# Patient Record
Sex: MALE | Race: WHITE | Employment: FULL TIME | URBAN - METROPOLITAN AREA
[De-identification: names, ages, dates, MRNs, and addresses within clinical notes are randomized per-mention and may not be internally consistent; named-entity substitution may affect disease eponyms.]

---

## 2020-01-28 ENCOUNTER — OFFICE VISIT (OUTPATIENT)
Dept: FAMILY MEDICINE CLINIC | Age: 30
End: 2020-01-28
Payer: COMMERCIAL

## 2020-01-28 VITALS
HEART RATE: 86 BPM | OXYGEN SATURATION: 98 % | HEIGHT: 70 IN | WEIGHT: 223 LBS | DIASTOLIC BLOOD PRESSURE: 84 MMHG | BODY MASS INDEX: 31.92 KG/M2 | TEMPERATURE: 98.2 F | SYSTOLIC BLOOD PRESSURE: 122 MMHG

## 2020-01-28 PROCEDURE — 99204 OFFICE O/P NEW MOD 45 MIN: CPT | Performed by: NURSE PRACTITIONER

## 2020-01-28 RX ORDER — FLUTICASONE PROPIONATE 50 MCG
2 SPRAY, SUSPENSION (ML) NASAL DAILY
Qty: 1 BOTTLE | Refills: 0 | Status: SHIPPED | OUTPATIENT
Start: 2020-01-28 | End: 2021-03-17

## 2020-01-28 RX ORDER — AMOXICILLIN AND CLAVULANATE POTASSIUM 875; 125 MG/1; MG/1
1 TABLET, FILM COATED ORAL 2 TIMES DAILY
Qty: 20 TABLET | Refills: 0 | Status: SHIPPED | OUTPATIENT
Start: 2020-01-28 | End: 2020-02-07

## 2020-01-28 ASSESSMENT — ENCOUNTER SYMPTOMS
DIARRHEA: 0
SINUS PRESSURE: 1
ABDOMINAL DISTENTION: 0
RHINORRHEA: 1
SINUS PAIN: 1
SORE THROAT: 1
CONSTIPATION: 0
SHORTNESS OF BREATH: 0
CHEST TIGHTNESS: 0
COUGH: 1
EYE DISCHARGE: 0
NAUSEA: 0
ABDOMINAL PAIN: 0
BACK PAIN: 0
VOMITING: 0
EYE ITCHING: 0
EYE PAIN: 0

## 2020-01-28 ASSESSMENT — PATIENT HEALTH QUESTIONNAIRE - PHQ9
1. LITTLE INTEREST OR PLEASURE IN DOING THINGS: 0
SUM OF ALL RESPONSES TO PHQ9 QUESTIONS 1 & 2: 0
SUM OF ALL RESPONSES TO PHQ QUESTIONS 1-9: 0
2. FEELING DOWN, DEPRESSED OR HOPELESS: 0
SUM OF ALL RESPONSES TO PHQ QUESTIONS 1-9: 0

## 2020-01-28 NOTE — PROGRESS NOTES
Conjunctiva/sclera: Conjunctivae normal.      Pupils: Pupils are equal, round, and reactive to light. Neck:      Musculoskeletal: Normal range of motion. Cardiovascular:      Rate and Rhythm: Normal rate and regular rhythm. Pulses: Normal pulses. Heart sounds: No murmur. Pulmonary:      Effort: Pulmonary effort is normal. No respiratory distress. Breath sounds: Normal breath sounds. No wheezing, rhonchi or rales. Abdominal:      General: Bowel sounds are normal. There is no distension. Palpations: Abdomen is soft. Musculoskeletal: Normal range of motion. Skin:     General: Skin is warm and dry. Findings: No rash. Neurological:      General: No focal deficit present. Mental Status: He is alert and oriented to person, place, and time. Deep Tendon Reflexes: Reflexes normal.   Psychiatric:         Behavior: Behavior normal.       I have reviewed all the lab results dating back to 2017. There are some abnormalities that are not critical to the patient's health, but did discuss them with him at this visit    Assessment:      Diagnosis Orders   1. Encounter for preventative adult health care examination  CBC    Comprehensive Metabolic Panel   2. Screening for cholesterol level  Lipid Panel   3. Screening for diabetes mellitus (DM)  Hemoglobin A1C   4. Acute bacterial sinusitis  amoxicillin-clavulanate (AUGMENTIN) 875-125 MG per tablet   5. Fatigue, unspecified type  Vitamin D 25 Hydroxy    TSH With Reflex Ft4        Plan:     1. Screening for cholesterol level  - Lipid Panel; Future    2. Screening for diabetes mellitus (DM)  - last A1c was in 2017--> 4.8  - Will reorder and call with results  - Hemoglobin A1C; Future    3. Encounter for preventative adult health care examination  - See above orders  - CBC; Future  - Comprehensive Metabolic Panel; Future    4.  Acute bacterial sinusitis  -Fluids, Tylenol, OTC Mucinex D and will call in Nasal spray and ATB  - Call if no improvement after course of ATB's or symptoms worsen  - amoxicillin-clavulanate (AUGMENTIN) 875-125 MG per tablet; Take 1 tablet by mouth 2 times daily for 10 days  Dispense: 20 tablet; Refill: 0    5. Fatigue, unspecified type  - TSH 4/2019--> 0.73 and repeat in 5/2019--> 0.46, will repeat and call with results  -Given felling of tiredness \"all the time\" will check Vitamin D level and call with results  - Vitamin D 25 Hydroxy; Future  - TSH With Reflex Ft4; Future    Rest of systems unchanged, continue current treatments. Medications, labs, diagnostic studies, consultations and follow-up as documented in this encounter.  Rest of systems unchanged, continue current treatments     Javier Ibarra, APRN-CNP

## 2020-01-28 NOTE — PROGRESS NOTES
Visit Information    Have you changed or started any medications since your last visit including any over-the-counter medicines, vitamins, or herbal medicines? no   Are you having any side effects from any of your medications? -  no  Have you stopped taking any of your medications? Is so, why? -  no    Have you seen any other physician or provider since your last visit? No  Have you had any other diagnostic tests since your last visit? No  Have you been seen in the emergency room and/or had an admission to a hospital since we last saw you? No  Have you had your routine dental cleaning in the past 6 months? yes - 12/2019    Have you activated your [employer] account? If not, what are your barriers?  Yes     Patient Care Team:  DEJAN Escobar NP as PCP - General (Nurse Practitioner)    Medical History Review  Past Medical, Family, and Social History reviewed and does not contribute to the patient presenting condition    Health Maintenance   Topic Date Due    Flu vaccine (1) 01/28/2021 (Originally 9/1/2019)    HIV screen  01/28/2021 (Originally 5/16/2006)    DTaP/Tdap/Td vaccine (2 - Tdap) 01/20/2025    Pneumococcal 0-64 years Vaccine  Aged Out    Varicella Vaccine  Discontinued

## 2020-07-27 ENCOUNTER — OFFICE VISIT (OUTPATIENT)
Dept: FAMILY MEDICINE CLINIC | Age: 30
End: 2020-07-27
Payer: COMMERCIAL

## 2020-07-27 VITALS
OXYGEN SATURATION: 98 % | BODY MASS INDEX: 28.12 KG/M2 | TEMPERATURE: 98.4 F | DIASTOLIC BLOOD PRESSURE: 98 MMHG | SYSTOLIC BLOOD PRESSURE: 150 MMHG | HEART RATE: 61 BPM | WEIGHT: 196 LBS

## 2020-07-27 PROCEDURE — 99214 OFFICE O/P EST MOD 30 MIN: CPT | Performed by: NURSE PRACTITIONER

## 2020-07-27 RX ORDER — PANTOPRAZOLE SODIUM 40 MG/1
40 TABLET, DELAYED RELEASE ORAL
Qty: 30 TABLET | Refills: 0 | Status: SHIPPED | OUTPATIENT
Start: 2020-07-27 | End: 2021-03-17

## 2020-07-27 RX ORDER — VENLAFAXINE HYDROCHLORIDE 37.5 MG/1
37.5 CAPSULE, EXTENDED RELEASE ORAL DAILY
Qty: 30 CAPSULE | Refills: 0 | Status: SHIPPED | OUTPATIENT
Start: 2020-07-27 | End: 2020-10-16 | Stop reason: SDUPTHER

## 2020-07-27 ASSESSMENT — ENCOUNTER SYMPTOMS
ABDOMINAL PAIN: 0
VOMITING: 0
SHORTNESS OF BREATH: 0
COUGH: 0
BACK PAIN: 0
NAUSEA: 1
SORE THROAT: 0
CHEST TIGHTNESS: 0
DIARRHEA: 1
ABDOMINAL DISTENTION: 0
CONSTIPATION: 0
BLOOD IN STOOL: 1
RHINORRHEA: 0

## 2020-07-27 NOTE — PROGRESS NOTES
Visit Information    Have you changed or started any medications since your last visit including any over-the-counter medicines, vitamins, or herbal medicines? no   Are you having any side effects from any of your medications? -  no  Have you stopped taking any of your medications? Is so, why? -  no    Have you seen any other physician or provider since your last visit? No  Have you had any other diagnostic tests since your last visit? No  Have you been seen in the emergency room and/or had an admission to a hospital since we last saw you? No  Have you had your routine dental cleaning in the past 6 months? no    Have you activated your [email or web address] account? If not, what are your barriers?  Yes     Patient Care Team:  DEJAN Aburto NP as PCP - General (Nurse Practitioner)  DEJAN Aburto NP as PCP - [facility] Provider    Medical History Review  Past Medical, Family, and Social History reviewed and does not contribute to the patient presenting condition    Health Maintenance   Topic Date Due    HIV screen  01/28/2021 (Originally 5/16/2006)    Flu vaccine (1) 09/01/2020    DTaP/Tdap/Td vaccine (2 - Tdap) 01/20/2025    Hepatitis A vaccine  Aged Out    Hepatitis B vaccine  Aged Out    Hib vaccine  Aged Out    Meningococcal (ACWY) vaccine  Aged Out    Pneumococcal 0-64 years Vaccine  Aged Out    Varicella vaccine  Discontinued

## 2020-07-27 NOTE — PROGRESS NOTES
Arden Kirk, APRN-CNP  [facility]  [id number], [address]  [address] [address]  Dept: [phone]  Dept Fax: [phone]     Patient ID: Dequan Peck is a 35 y.o. male. HPI    Pt here today for an acute visit secondary to rectal bleeding, nausea, fatigue, insomnia and high blood pressure. Per patient, these complaints have been ongoing for 3-4 years. He relates that no one takes him seriously. Pt denies any fever or chills. Pt today denies any HA, chest pain, or SOB. Pt denies any N/V/D/C or abdominal pain. He does complain of increased anxiety. He relates that he has always been anxious, but relates that it has gotten worse. He does relate that he used to talk to a counselor but stopped going. He believes his anxiety is affecting his life, especially his marriage. He also complains of blood in his stool. He relates that he has had EGD's/colonoscopies and they have not found anything wrong with him. He is frustrated with this. He also complains of ongoing nausea. Otherwise pt doing well on current tx and no other concerns today. The patient's past medical, surgical, social, and family history as well as his current medications and allergies were reviewed as documented in today's encounter by CARLY Rosales. Current Outpatient Medications on File Prior to Visit   Medication Sig Dispense Refill    Probiotic Product (PROBIOTIC-10 ULTIMATE PO) Take by mouth      fluticasone (FLONASE) 50 MCG/ACT nasal spray 2 sprays by Each Nostril route daily 1 Bottle 0     No current facility-administered medications on file prior to visit. Subjective:     Review of Systems   Constitutional: Positive for fatigue. Negative for activity change and fever. HENT: Negative for congestion, ear pain, rhinorrhea and sore throat. Respiratory: Negative for cough, chest tightness and shortness of breath.     Cardiovascular: Negative for chest pain and palpitations. Gastrointestinal: Positive for blood in stool (ongoing for 3-4 years), diarrhea (in the mornings, ) and nausea. Negative for abdominal distention, abdominal pain, constipation and vomiting. Endocrine: Negative for polydipsia, polyphagia and polyuria. Genitourinary: Negative for difficulty urinating and dysuria. Musculoskeletal: Negative for arthralgias, back pain and myalgias. Skin: Negative for rash. Neurological: Negative for dizziness, weakness, light-headedness and headaches. Hematological: Negative for adenopathy. Psychiatric/Behavioral: Positive for sleep disturbance. Negative for agitation and behavioral problems. The patient is nervous/anxious. Objective:     Physical Exam  Vitals signs reviewed. Constitutional:       General: He is not in acute distress. Appearance: Normal appearance. HENT:      Head: Normocephalic and atraumatic. Right Ear: External ear normal.      Left Ear: External ear normal.      Nose: Nose normal.      Mouth/Throat:      Mouth: Mucous membranes are moist.      Pharynx: No oropharyngeal exudate or posterior oropharyngeal erythema. Eyes:      Extraocular Movements: Extraocular movements intact. Conjunctiva/sclera: Conjunctivae normal.      Pupils: Pupils are equal, round, and reactive to light. Neck:      Musculoskeletal: Normal range of motion. Cardiovascular:      Rate and Rhythm: Normal rate and regular rhythm. Pulses: Normal pulses. Heart sounds: No murmur. Pulmonary:      Effort: Pulmonary effort is normal. No respiratory distress. Breath sounds: Normal breath sounds. No wheezing, rhonchi or rales. Abdominal:      General: Bowel sounds are normal. There is no distension. Palpations: Abdomen is soft. Musculoskeletal: Normal range of motion. Skin:     General: Skin is warm and dry. Findings: No rash. Neurological:      General: No focal deficit present.       Mental Status: He is alert and oriented to person, place, and time. Deep Tendon Reflexes: Reflexes normal.   Psychiatric:         Mood and Affect: Mood is anxious. Speech: Speech is rapid and pressured. Behavior: Behavior is hyperactive. Assessment:      Diagnosis Orders   1. Anxiety  venlafaxine (EFFEXOR XR) 37.5 MG extended release capsule   2. Blood pressure elevated without history of HTN     3. Blood in stool  pantoprazole (PROTONIX) 40 MG tablet   4. Nausea  pantoprazole (PROTONIX) 40 MG tablet   5. Insomnia, unspecified type       Plan:     1. Anxiety  - Extensive discussion had with patient. I do believe that a lot of his symptoms are contributory to his anxiety.   - He did relate that his wife is very concerned and \"made the appointment\" for him. 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 He does  the slack for others and he believes this is weighing on him. - He also relates that he is having difficulty sleeping. We did discuss this being related to his anxiety as well  - After lengthy discussion with patient regarding life events and feelings of nervousness, restlessness, and difficulty relaxing, together we opted for pharmacological intervention. Will start Effexor  - I've explained to him that drugs of the SNRI class can have side effects such as insomnia, headache, nausea. These medications are generally effective at alleviating symptoms of anxiety and/or depression.   - Let me know if significant side effects do occur  - Pt encouraged to deep breath and relax through anxious moments   - Offered reassurance and allowed to ventilate feelings and ask questions. - Patient is continuing to monitor for triggers of increase anxiety and/or stressors.   - Encouraged patient to express needs and concerns.   - Support provided. - Non-pharmological coping methods discussed.      2. Blood pressure elevated without history of HTN  - BP noted  - Discussion had regarding nonpharmacologic interventions to reduce BP. These include, but are not limited to weight loss, a heart healthy diet, sodium restriction, and potassium supplementation within the diet; and increased physical activity with a structured exercise program.   - Men should be limited to no more than 2 and women no more than 1 standard alcohol drink(s) per day. - Take BP daily at home with goal <140/90, call the office if not sustaining below this goal- log provided  - Decrease salt in diet, healthy diet and routine exercise encouraged  - Education provided on maintaining a low sodium diet, routine exercise, as well as maintaining a daily blood pressure log.  - Advised to seek emergent tx if SBP>180 and/or DBP>110, any chest pain, h/a or vision changes  - Will have him back in a month for a BP check. We did discuss the possibility of pharmacological management  - ? Anxiety related  - venlafaxine (EFFEXOR XR) 37.5 MG extended release capsule; Take 1 capsule by mouth daily  Dispense: 30 capsule; Refill: 0    3. Blood in stool  4. Nausea  - Will check labs and discuss them with him at his follow up visit. He did relate that this week, so far, he hasn't seen any blood   - Will start PPI  - pantoprazole (PROTONIX) 40 MG tablet; Take 1 tablet by mouth every morning (before breakfast)  Dispense: 30 tablet; Refill: 0    5. Insomnia, unspecified type  - At this point, I will hold off on treating his insomnia. We will try to treat his anxiety and see if this helps. If he is still not sleeping at his follow up, will try pharmacological management  - Discussed sleep hygiene   - Limit caffeine after noon  - No naps during day  - Will try OTC Melatonin at HS and will call if no improvement     - Rest of systems unchanged, continue current treatments. - Medications, labs, diagnostic studies, consultations and follow-up as documented in this encounter.  Rest of systems unchanged, continue current treatments    Mcghee Jason

## 2020-08-27 PROBLEM — F41.9 ANXIETY: Status: ACTIVE | Noted: 2020-08-27

## 2020-09-28 ENCOUNTER — HOSPITAL ENCOUNTER (OUTPATIENT)
Age: 30
Discharge: HOME | End: 2020-09-28
Payer: COMMERCIAL

## 2020-09-28 LAB
ALBUMIN SERPL-MCNC: 4.5 G/DL (ref 3.5–5.2)
ALBUMIN/GLOBULIN RATIO: 2 (ref 1–2.5)
ALP BLD-CCNC: 57 U/L (ref 40–129)
ALT SERPL-CCNC: 17 U/L (ref 5–41)
ANION GAP SERPL CALCULATED.3IONS-SCNC: 11 MMOL/L (ref 9–17)
AST SERPL-CCNC: 17 U/L
BILIRUB SERPL-MCNC: 0.35 MG/DL (ref 0.3–1.2)
BUN BLDV-MCNC: 15 MG/DL (ref 6–20)
BUN/CREAT BLD: NORMAL (ref 9–20)
CALCIUM SERPL-MCNC: 9.4 MG/DL (ref 8.6–10.4)
CHLORIDE BLD-SCNC: 105 MMOL/L (ref 98–107)
CHOLESTEROL/HDL RATIO: 3.1
CHOLESTEROL: 163 MG/DL
CO2: 25 MMOL/L (ref 20–31)
CREAT SERPL-MCNC: 0.97 MG/DL (ref 0.7–1.2)
GFR AFRICAN AMERICAN: >60 ML/MIN
GFR NON-AFRICAN AMERICAN: >60 ML/MIN
GFR SERPL CREATININE-BSD FRML MDRD: NORMAL ML/MIN/{1.73_M2}
GFR SERPL CREATININE-BSD FRML MDRD: NORMAL ML/MIN/{1.73_M2}
GLUCOSE BLD-MCNC: 97 MG/DL (ref 70–99)
HCT VFR BLD CALC: 42.3 % (ref 40.7–50.3)
HDLC SERPL-MCNC: 52 MG/DL
HEMOGLOBIN: 14.3 G/DL (ref 13–17)
LDL CHOLESTEROL: 91 MG/DL (ref 0–130)
MCH RBC QN AUTO: 30.1 PG (ref 25.2–33.5)
MCHC RBC AUTO-ENTMCNC: 33.8 G/DL (ref 28.4–34.8)
MCV RBC AUTO: 89.1 FL (ref 82.6–102.9)
NRBC AUTOMATED: 0 PER 100 WBC
PDW BLD-RTO: 12.5 % (ref 11.8–14.4)
PLATELET # BLD: 317 K/UL (ref 138–453)
PMV BLD AUTO: 9.4 FL (ref 8.1–13.5)
POTASSIUM SERPL-SCNC: 4 MMOL/L (ref 3.7–5.3)
RBC # BLD: 4.75 M/UL (ref 4.21–5.77)
SODIUM BLD-SCNC: 141 MMOL/L (ref 135–144)
TOTAL PROTEIN: 6.7 G/DL (ref 6.4–8.3)
TRIGL SERPL-MCNC: 98 MG/DL
TSH SERPL DL<=0.05 MIU/L-ACNC: 1.11 MIU/L (ref 0.3–5)
VITAMIN D 25-HYDROXY: 28.7 NG/ML (ref 30–100)
VLDLC SERPL CALC-MCNC: NORMAL MG/DL (ref 1–30)
WBC # BLD: 6.6 K/UL (ref 3.5–11.3)

## 2020-09-28 PROCEDURE — 84443 ASSAY THYROID STIM HORMONE: CPT

## 2020-09-28 PROCEDURE — 80053 COMPREHEN METABOLIC PANEL: CPT

## 2020-09-28 PROCEDURE — 36415 COLL VENOUS BLD VENIPUNCTURE: CPT

## 2020-09-28 PROCEDURE — 85027 COMPLETE CBC AUTOMATED: CPT

## 2020-09-28 PROCEDURE — 83036 HEMOGLOBIN GLYCOSYLATED A1C: CPT

## 2020-09-28 PROCEDURE — 82306 VITAMIN D 25 HYDROXY: CPT

## 2020-09-28 PROCEDURE — 80061 LIPID PANEL: CPT

## 2020-09-29 PROBLEM — E55.9 VITAMIN D DEFICIENCY: Status: ACTIVE | Noted: 2020-09-29

## 2020-09-29 LAB
ESTIMATED AVERAGE GLUCOSE: 94 MG/DL
HBA1C MFR BLD: 4.9 % (ref 4–6)

## 2020-09-29 RX ORDER — ERGOCALCIFEROL 1.25 MG/1
50000 CAPSULE ORAL WEEKLY
Qty: 12 CAPSULE | Refills: 0 | Status: SHIPPED | OUTPATIENT
Start: 2020-09-29 | End: 2020-10-26 | Stop reason: SDUPTHER

## 2020-10-01 ENCOUNTER — TELEPHONE (OUTPATIENT)
Dept: FAMILY MEDICINE CLINIC | Age: 30
End: 2020-10-01

## 2020-10-12 ENCOUNTER — TELEPHONE (OUTPATIENT)
Dept: FAMILY MEDICINE CLINIC | Age: 30
End: 2020-10-12

## 2020-10-12 NOTE — TELEPHONE ENCOUNTER
Spoke with patient and relayed message. Patient said he will try taking Tylenol and will report how he is feeling at next appointment on Friday 10/16/20.

## 2020-10-12 NOTE — TELEPHONE ENCOUNTER
Patient's wife Julfarhad Soledad) called, said patient took Vitamin D last night and got a really bad migraine. She is wondering if this is a side effect and if so what should he do?

## 2020-10-16 ENCOUNTER — OFFICE VISIT (OUTPATIENT)
Dept: FAMILY MEDICINE CLINIC | Age: 30
End: 2020-10-16
Payer: COMMERCIAL

## 2020-10-16 VITALS
HEART RATE: 73 BPM | OXYGEN SATURATION: 97 % | TEMPERATURE: 98.4 F | HEIGHT: 70 IN | RESPIRATION RATE: 12 BRPM | BODY MASS INDEX: 28.49 KG/M2 | SYSTOLIC BLOOD PRESSURE: 116 MMHG | WEIGHT: 199 LBS | DIASTOLIC BLOOD PRESSURE: 74 MMHG

## 2020-10-16 PROBLEM — K92.1 HEMATOCHEZIA: Status: ACTIVE | Noted: 2018-11-12

## 2020-10-16 PROBLEM — M25.512 CHRONIC LEFT SHOULDER PAIN: Status: ACTIVE | Noted: 2019-10-03

## 2020-10-16 PROBLEM — G89.29 CHRONIC LEFT SHOULDER PAIN: Status: ACTIVE | Noted: 2019-10-03

## 2020-10-16 PROBLEM — Z86.19 HISTORY OF HELICOBACTER PYLORI INFECTION: Status: ACTIVE | Noted: 2019-06-04

## 2020-10-16 PROBLEM — K58.0 IRRITABLE BOWEL SYNDROME WITH DIARRHEA: Status: ACTIVE | Noted: 2019-09-25

## 2020-10-16 PROBLEM — N40.1 BENIGN NON-NODULAR PROSTATIC HYPERPLASIA WITH LOWER URINARY TRACT SYMPTOMS: Status: ACTIVE | Noted: 2017-05-16

## 2020-10-16 PROBLEM — R06.83 SNORING: Status: ACTIVE | Noted: 2019-06-04

## 2020-10-16 PROBLEM — R53.82 CHRONIC FATIGUE: Status: ACTIVE | Noted: 2019-06-04

## 2020-10-16 PROBLEM — K52.9 CHRONIC DIARRHEA: Status: ACTIVE | Noted: 2018-11-30

## 2020-10-16 PROBLEM — M75.32 CALCIFIC TENDONITIS OF LEFT SHOULDER: Status: ACTIVE | Noted: 2019-10-03

## 2020-10-16 PROBLEM — E05.90 HYPERTHYROIDISM, SUBCLINICAL: Status: ACTIVE | Noted: 2019-06-04

## 2020-10-16 PROBLEM — M77.8 SHOULDER TENDINITIS, LEFT: Status: ACTIVE | Noted: 2019-10-03

## 2020-10-16 PROBLEM — F33.9 EPISODE OF RECURRENT MAJOR DEPRESSIVE DISORDER (HCC): Status: ACTIVE | Noted: 2020-10-16

## 2020-10-16 PROBLEM — R35.0 URINARY FREQUENCY: Status: ACTIVE | Noted: 2017-05-16

## 2020-10-16 PROCEDURE — 99213 OFFICE O/P EST LOW 20 MIN: CPT | Performed by: NURSE PRACTITIONER

## 2020-10-16 RX ORDER — CALCIUM CARBONATE/VITAMIN D3 600MG-62.5
1 CAPSULE ORAL DAILY
Qty: 90 CAPSULE | Refills: 1 | Status: SHIPPED | OUTPATIENT
Start: 2020-10-16 | End: 2021-01-14

## 2020-10-16 RX ORDER — VENLAFAXINE HYDROCHLORIDE 75 MG/1
75 CAPSULE, EXTENDED RELEASE ORAL DAILY
Qty: 30 CAPSULE | Refills: 3 | Status: SHIPPED | OUTPATIENT
Start: 2020-10-16 | End: 2021-02-03 | Stop reason: ALTCHOICE

## 2020-10-16 ASSESSMENT — ENCOUNTER SYMPTOMS
RHINORRHEA: 0
COUGH: 0
DIARRHEA: 0
NAUSEA: 0
CONSTIPATION: 0
SHORTNESS OF BREATH: 0
BACK PAIN: 0
CHEST TIGHTNESS: 0
ABDOMINAL PAIN: 0
ABDOMINAL DISTENTION: 0
VOMITING: 0
SORE THROAT: 0

## 2020-10-16 NOTE — PROGRESS NOTES
Visit Information    Have you changed or started any medications since your last visit including any over-the-counter medicines, vitamins, or herbal medicines? no   Are you having any side effects from any of your medications? -  no  Have you stopped taking any of your medications? Is so, why? -  no    Have you seen any other physician or provider since your last visit? No  Have you had any other diagnostic tests since your last visit? Yes - Records Obtained  Have you been seen in the emergency room and/or had an admission to a hospital since we last saw you? No  Have you had your routine dental cleaning in the past 6 months? yes -     Have you activated your [employer] account? If not, what are your barriers?  Yes     Patient Care Team:  DEJAN Sesay NP as PCP - General (Nurse Practitioner)  DEJAN Sesay NP as PCP - [facility] Provider    Medical History Review  Past Medical, Family, and Social History reviewed and does contribute to the patient presenting condition    Health Maintenance   Topic Date Due    Flu vaccine (1) 09/01/2020    HIV screen  01/28/2021 (Originally 5/16/2006)    TSH testing  09/28/2021    DTaP/Tdap/Td vaccine (7 - Tdap) 01/20/2025    Hib vaccine  Completed    Hepatitis A vaccine  Aged Out    Hepatitis B vaccine  Aged Out    Meningococcal (ACWY) vaccine  Aged Out    Pneumococcal 0-64 years Vaccine  Aged Out    Varicella vaccine  Discontinued

## 2020-10-16 NOTE — PROGRESS NOTES
Negative for congestion, ear pain, rhinorrhea and sore throat. Respiratory: Negative for cough, chest tightness and shortness of breath. Cardiovascular: Negative for chest pain and palpitations. Gastrointestinal: Negative for abdominal distention, abdominal pain, constipation, diarrhea, nausea and vomiting. Endocrine: Negative for polydipsia, polyphagia and polyuria. Genitourinary: Negative for difficulty urinating and dysuria. Musculoskeletal: Negative for arthralgias, back pain and myalgias. Skin: Negative for rash. Neurological: Negative for dizziness, weakness, light-headedness and headaches. Hematological: Negative for adenopathy. Psychiatric/Behavioral: Positive for dysphoric mood and sleep disturbance. Negative for agitation, behavioral problems and suicidal ideas (denies). The patient is nervous/anxious. Objective:     Physical Exam  Vitals signs reviewed. Constitutional:       General: He is not in acute distress. Appearance: Normal appearance. He is well-developed. HENT:      Head: Normocephalic and atraumatic. Right Ear: Hearing and external ear normal.      Left Ear: Hearing and external ear normal.      Nose: Nose normal. No congestion. Right Sinus: No maxillary sinus tenderness or frontal sinus tenderness. Left Sinus: No maxillary sinus tenderness or frontal sinus tenderness. Mouth/Throat:      Lips: Pink. No lesions. Mouth: Mucous membranes are moist. No oral lesions. Tongue: No lesions. Pharynx: Oropharynx is clear. No oropharyngeal exudate or posterior oropharyngeal erythema. Eyes:      Extraocular Movements: Extraocular movements intact. Conjunctiva/sclera: Conjunctivae normal.      Pupils: Pupils are equal, round, and reactive to light. Neck:      Musculoskeletal: Full passive range of motion without pain and normal range of motion. Thyroid: No thyroid mass.    Cardiovascular:      Rate and Rhythm: Normal rate and regular rhythm. Pulses: Normal pulses. Heart sounds: Normal heart sounds. No murmur. Pulmonary:      Effort: Pulmonary effort is normal. No respiratory distress. Breath sounds: Normal breath sounds and air entry. No wheezing, rhonchi or rales. Abdominal:      General: Bowel sounds are normal. There is no distension. Palpations: Abdomen is soft. Tenderness: There is no abdominal tenderness. Musculoskeletal: Normal range of motion. Right lower leg: No edema. Left lower leg: No edema. Lymphadenopathy:      Cervical: No cervical adenopathy. Skin:     General: Skin is warm and dry. Capillary Refill: Capillary refill takes less than 2 seconds. Findings: No rash. Neurological:      General: No focal deficit present. Mental Status: He is alert and oriented to person, place, and time. Deep Tendon Reflexes: Reflexes normal.   Psychiatric:         Attention and Perception: Attention and perception normal.         Mood and Affect: Mood is depressed. Affect is labile, flat and tearful. Speech: Speech is delayed. Behavior: Behavior is withdrawn. Behavior is cooperative. Thought Content: Thought content does not include suicidal ideation. Thought content does not include suicidal plan. Cognition and Memory: Memory normal.       I have reviewed all the lab results. There are some abnormalities that are not critical to the patient's health, but did discuss them with him at this visit. Assessment:      Diagnosis Orders   1. Vitamin D deficiency     2. Anxiety  venlafaxine (EFFEXOR XR) 75 MG extended release capsule   3. Severe episode of recurrent major depressive disorder, without psychotic features ([address]         Plan:     1. Anxiety  2. Severe episode of recurrent major depressive disorder, without psychotic features ([address]  - Will have him schedule with Jaquelin Eduardo. He is agreeable  - Will increase Effexor to 75mg daily.  We did discuss importance of taking it regularly. He did verbalize understanding  - Will have him back in a month for evaluation  - venlafaxine (EFFEXOR XR) 75 MG extended release capsule; Take 1 capsule by mouth daily  Dispense: 30 capsule; Refill: 3    3. Vitamin D deficiency  - Vitamin D level low. I did call him in high dose vitamin D. He did relate that after he took the first weekly dose, he developed a migraine that lasted 12 hours. He has not or ever had a migraine like that  - Will have him hold off on high dose vitamin d at this time. He has taken the 1,000IU before. I did tell him to take a couple daily and work his way up    - Rest of systems unchanged, continue current treatments. - Medications, labs, diagnostic studies, consultations and follow-up as documented in this encounter.  Rest of systems unchanged, continue current treatments    DEJAN Crockett-CNP

## 2020-10-23 ENCOUNTER — TELEPHONE (OUTPATIENT)
Dept: BEHAVIORAL/MENTAL HEALTH CLINIC | Age: 30
End: 2020-10-23

## 2020-10-26 ENCOUNTER — TELEPHONE (OUTPATIENT)
Dept: FAMILY MEDICINE CLINIC | Age: 30
End: 2020-10-26

## 2020-10-26 RX ORDER — ERGOCALCIFEROL 1.25 MG/1
50000 CAPSULE ORAL WEEKLY
Qty: 12 CAPSULE | Refills: 0 | Status: SHIPPED | OUTPATIENT
Start: 2020-10-26 | End: 2021-02-03 | Stop reason: ALTCHOICE

## 2020-10-26 NOTE — TELEPHONE ENCOUNTER
The Vitamin D was already sent over, but I did resend it. As far as the effexor and intimacy problems, I would give it some time. It has only been 10 days since the re-initiation of the medication. Most side effects can improve over time. At this point, if the medication is helping his mood, I would prefer to continue it. If the side effects continue then we can look at either adding something else or changing the medication all together. Let's give it until his next appointment with me on 11/13/2020.

## 2020-10-26 NOTE — TELEPHONE ENCOUNTER
Called to check on the Vitamin D pills that were supposed to be sent into the pharmacy. Patient would also like to figure out a solution for the medication Venlafaxine - sense he has been taking this, he has been having trouble with his intimacy life with this spouse.        Would like a call back from clinical.

## 2020-10-27 ENCOUNTER — TELEPHONE (OUTPATIENT)
Dept: FAMILY MEDICINE CLINIC | Age: 30
End: 2020-10-27

## 2020-10-27 NOTE — TELEPHONE ENCOUNTER
[facility] called and said that the probiotic called over for patient is on backorder.   They want to know if you want to send another probiotic over or have him get one OTC

## 2020-12-10 ENCOUNTER — TELEPHONE (OUTPATIENT)
Dept: FAMILY MEDICINE CLINIC | Age: 30
End: 2020-12-10

## 2020-12-10 NOTE — TELEPHONE ENCOUNTER
Please let patient know that [facility] is not allowing us to test patients unless they are symptomatic. I would recommend going to to one of the free standing testing centers in his community.

## 2020-12-10 NOTE — TELEPHONE ENCOUNTER
Patient was advised to call his PCP to be tested for COVID. Wife tested positive and she is pregnant. He is having no symptoms but is being advised to be tested. Please advise

## 2021-01-12 ENCOUNTER — TELEMEDICINE (OUTPATIENT)
Dept: FAMILY MEDICINE CLINIC | Age: 31
End: 2021-01-12
Payer: COMMERCIAL

## 2021-01-12 DIAGNOSIS — R10.9 ABDOMINAL CRAMPING: ICD-10-CM

## 2021-01-12 DIAGNOSIS — F41.9 ANXIETY: ICD-10-CM

## 2021-01-12 DIAGNOSIS — R11.0 NAUSEA: ICD-10-CM

## 2021-01-12 DIAGNOSIS — R63.4 WEIGHT LOSS: ICD-10-CM

## 2021-01-12 DIAGNOSIS — K62.5 RECTAL BLEEDING: Primary | ICD-10-CM

## 2021-01-12 PROCEDURE — 99214 OFFICE O/P EST MOD 30 MIN: CPT | Performed by: NURSE PRACTITIONER

## 2021-01-12 RX ORDER — VENLAFAXINE HYDROCHLORIDE 37.5 MG/1
37.5 CAPSULE, EXTENDED RELEASE ORAL DAILY
Qty: 90 CAPSULE | Refills: 0 | Status: SHIPPED | OUTPATIENT
Start: 2021-01-12 | End: 2021-05-10

## 2021-01-12 NOTE — PROGRESS NOTES
Denisse Mc, APRN-[address]  [id number]  [address] [address]  Dept: [phone]  Dept Fax: [phone]     Patient ID: Marlon Peacock is a 35 y.o. male. HPI    Pt here today for an acute visit secondary to headache, nausea and a \"numbing\" sensation in his head. He had been taking Effexor and was doing well. He did run out of the medication for about a week. He did get it refilled and then resumed dosing as previously ordered, but symptoms are persisting. He also complains of rectal bleeding. He relates that it has been ongoing for years. He was seen back in 2019 by Dr. Kevin Back and an EGD/Colonoscopy with polypectomy was preformed. EGD did show mild inflammation, an irregular z-line,  And esophagitis without bleeding. Colonoscopy report was unremarkable. Pathology was reviewed and did show chronic superficial gastritis associated with H-pylori. He was prescribed PPI. He does relate that he has had a 40 lb weight loss since April/May. He relates that he does not have much of an appetite and when he does eat he will get nauseated. Pt denies any fever or chills. Pt today denies any HA, chest pain, or SOB. Pt denies any N/V/D/C or abdominal pain. The patient's past medical, surgical, social, and family history as well as his current medications and allergies were reviewed as documented in today's encounter by CARLY Vargas.          Current Outpatient Medications on File Prior to Visit   Medication Sig Dispense Refill    vitamin D (ERGOCALCIFEROL) 1.25 MG (68517 UT) CAPS capsule Take 1 capsule by mouth once a week 12 capsule 0    venlafaxine (EFFEXOR XR) 75 MG extended release capsule Take 1 capsule by mouth daily 30 capsule 3    Probiotic Product (PROBIOTIC-10 ULTIMATE) CAPS Take 1 capsule by mouth daily 90 capsule 1 Psychiatric:         Mood and Affect: Mood normal.         Speech: Speech normal.         Behavior: Behavior normal. Behavior is cooperative. Assessment:      Diagnosis Orders   1. Rectal bleeding  CT ABDOMEN PELVIS W IV CONTRAST    CBC    Comprehensive Metabolic Panel    C-Reactive Protein    Sedimentation Rate    Lipase    Amylase   2. Abdominal cramping  CT ABDOMEN PELVIS W IV CONTRAST    CBC    Comprehensive Metabolic Panel    C-Reactive Protein    Sedimentation Rate    Lipase    Amylase   3. Nausea  CT ABDOMEN PELVIS W IV CONTRAST    CBC    Comprehensive Metabolic Panel    C-Reactive Protein    Sedimentation Rate    Lipase    Amylase   4. Weight loss  CT ABDOMEN PELVIS W IV CONTRAST    CBC    Comprehensive Metabolic Panel    C-Reactive Protein    Sedimentation Rate    Lipase    Amylase   5. Anxiety  venlafaxine (EFFEXOR XR) 37.5 MG extended release capsule     Plan:     1. Rectal bleeding  2. Abdominal cramping  3. Nausea  4. Weight loss  - Despite normal colonoscopy in 2019, symptoms continue  - No CT for comparison  - GI referral once testing complete  - CT ABDOMEN PELVIS W IV CONTRAST; Future  - CBC; Future  - Comprehensive Metabolic Panel; Future  - C-Reactive Protein; Future  - Sedimentation Rate; Future  - Lipase; Future  - Amylase; Future    5. Anxiety  - symptoms related to abruptly stopping Effexor  - I did discuss with patient regarding resuming the Effexor at a lower dose and titrating up   - venlafaxine (EFFEXOR XR) 37.5 MG extended release capsule; Take 1 capsule by mouth daily May increase to 75 mg daily in 2-3 weeks if needed  Dispense: 90 capsule; Refill: 0    - Rest of systems unchanged, continue current treatments. - Medications, labs, diagnostic studies, consultations and follow-up as documented in this encounter.  Rest of systems unchanged, continue current treatments    DEJAN Magana-CNP

## 2021-01-12 NOTE — PROGRESS NOTES
Visit Information    Have you changed or started any medications since your last visit including any over-the-counter medicines, vitamins, or herbal medicines? no   Are you having any side effects from any of your medications? -  no  Have you stopped taking any of your medications? Is so, why? -  no    Have you seen any other physician or provider since your last visit? No  Have you had any other diagnostic tests since your last visit? No  Have you been seen in the emergency room and/or had an admission to a hospital since we last saw you? No  Have you had your routine dental cleaning in the past 6 months? no    Have you activated your [employer] account? If not, what are your barriers?  Yes     Patient Care Team:  DEJAN Chung NP as PCP - General (Nurse Practitioner)  DEJAN Chung NP as PCP - [facility]    Medical History Review  Past Medical, Family, and Social History reviewed and does not contribute to the patient presenting condition    Health Maintenance   Topic Date Due    Hepatitis C screen  1990    HIV screen  01/28/2021 (Originally 5/16/2006)    Flu vaccine (1) 10/16/2021 (Originally 9/1/2020)    TSH testing  09/28/2021    DTaP/Tdap/Td vaccine (7 - Tdap) 01/20/2025    Hib vaccine  Completed    Hepatitis A vaccine  Aged Out    Hepatitis B vaccine  Aged Out    Meningococcal (ACWY) vaccine  Aged Out    Pneumococcal 0-64 years Vaccine  Aged Out    Varicella vaccine  Discontinued Pt is currently on Toradol q4 15mg IV prn  Pt is allergic to tramadol, oxycodone  Discharge pt on oral pain meds

## 2021-01-14 ASSESSMENT — ENCOUNTER SYMPTOMS
ABDOMINAL PAIN: 1
DIARRHEA: 1
NAUSEA: 1
SHORTNESS OF BREATH: 0
SORE THROAT: 0
COUGH: 0
CONSTIPATION: 0
RHINORRHEA: 0
ABDOMINAL DISTENTION: 0
VOMITING: 0
CHEST TIGHTNESS: 0
BACK PAIN: 0
BLOOD IN STOOL: 1

## 2021-02-01 ENCOUNTER — HOSPITAL ENCOUNTER (OUTPATIENT)
Dept: CT IMAGING | Age: 31
Discharge: HOME | End: 2021-02-03
Payer: COMMERCIAL

## 2021-02-01 ENCOUNTER — HOSPITAL ENCOUNTER (OUTPATIENT)
Age: 31
Discharge: HOME | End: 2021-02-01
Payer: COMMERCIAL

## 2021-02-01 DIAGNOSIS — R11.0 NAUSEA: ICD-10-CM

## 2021-02-01 DIAGNOSIS — K62.5 RECTAL BLEEDING: ICD-10-CM

## 2021-02-01 DIAGNOSIS — R10.9 ABDOMINAL CRAMPING: ICD-10-CM

## 2021-02-01 DIAGNOSIS — R63.4 WEIGHT LOSS: ICD-10-CM

## 2021-02-01 LAB
ALBUMIN SERPL-MCNC: 4.7 G/DL (ref 3.5–5.2)
ALBUMIN/GLOBULIN RATIO: 1.7 (ref 1–2.5)
ALP BLD-CCNC: 61 U/L (ref 40–129)
ALT SERPL-CCNC: 13 U/L (ref 5–41)
AMYLASE: 39 U/L (ref 28–100)
ANION GAP SERPL CALCULATED.3IONS-SCNC: 12 MMOL/L (ref 9–17)
AST SERPL-CCNC: 18 U/L
BILIRUB SERPL-MCNC: 0.44 MG/DL (ref 0.3–1.2)
BUN BLDV-MCNC: 14 MG/DL (ref 6–20)
BUN/CREAT BLD: NORMAL (ref 9–20)
C-REACTIVE PROTEIN: 7.8 MG/L (ref 0–5)
CALCIUM SERPL-MCNC: 9.7 MG/DL (ref 8.6–10.4)
CHLORIDE BLD-SCNC: 102 MMOL/L (ref 98–107)
CO2: 26 MMOL/L (ref 20–31)
CREAT SERPL-MCNC: 0.9 MG/DL (ref 0.7–1.2)
GFR AFRICAN AMERICAN: >60 ML/MIN
GFR NON-AFRICAN AMERICAN: >60 ML/MIN
GFR SERPL CREATININE-BSD FRML MDRD: NORMAL ML/MIN/{1.73_M2}
GFR SERPL CREATININE-BSD FRML MDRD: NORMAL ML/MIN/{1.73_M2}
GLUCOSE BLD-MCNC: 94 MG/DL (ref 70–99)
HCT VFR BLD CALC: 45.5 % (ref 40.7–50.3)
HEMOGLOBIN: 15.1 G/DL (ref 13–17)
LIPASE: 12 U/L (ref 13–60)
MCH RBC QN AUTO: 30.1 PG (ref 25.2–33.5)
MCHC RBC AUTO-ENTMCNC: 33.2 G/DL (ref 28.4–34.8)
MCV RBC AUTO: 90.6 FL (ref 82.6–102.9)
NRBC AUTOMATED: 0 PER 100 WBC
PDW BLD-RTO: 12.8 % (ref 11.8–14.4)
PLATELET # BLD: 298 K/UL (ref 138–453)
PMV BLD AUTO: 9.3 FL (ref 8.1–13.5)
POTASSIUM SERPL-SCNC: 4 MMOL/L (ref 3.7–5.3)
RBC # BLD: 5.02 M/UL (ref 4.21–5.77)
SEDIMENTATION RATE, ERYTHROCYTE: 2 MM (ref 0–15)
SODIUM BLD-SCNC: 140 MMOL/L (ref 135–144)
TOTAL PROTEIN: 7.4 G/DL (ref 6.4–8.3)
VITAMIN D 25-HYDROXY: 27 NG/ML (ref 30–100)
WBC # BLD: 7.2 K/UL (ref 3.5–11.3)

## 2021-02-01 PROCEDURE — 6360000004 HC RX CONTRAST MEDICATION: Performed by: NURSE PRACTITIONER

## 2021-02-01 PROCEDURE — 74177 CT ABD & PELVIS W/CONTRAST: CPT

## 2021-02-01 PROCEDURE — 2580000003 HC RX 258: Performed by: NURSE PRACTITIONER

## 2021-02-01 PROCEDURE — 85027 COMPLETE CBC AUTOMATED: CPT

## 2021-02-01 PROCEDURE — 80053 COMPREHEN METABOLIC PANEL: CPT

## 2021-02-01 PROCEDURE — 83690 ASSAY OF LIPASE: CPT

## 2021-02-01 PROCEDURE — 36415 COLL VENOUS BLD VENIPUNCTURE: CPT

## 2021-02-01 PROCEDURE — 85652 RBC SED RATE AUTOMATED: CPT

## 2021-02-01 PROCEDURE — 86140 C-REACTIVE PROTEIN: CPT

## 2021-02-01 PROCEDURE — 82150 ASSAY OF AMYLASE: CPT

## 2021-02-01 PROCEDURE — 82306 VITAMIN D 25 HYDROXY: CPT

## 2021-02-01 RX ORDER — SODIUM CHLORIDE 0.9 % (FLUSH) 0.9 %
10 SYRINGE (ML) INJECTION PRN
Status: DISCONTINUED | OUTPATIENT
Start: 2021-02-01 | End: 2021-02-04 | Stop reason: HOSPADM

## 2021-02-01 RX ORDER — 0.9 % SODIUM CHLORIDE 0.9 %
80 INTRAVENOUS SOLUTION INTRAVENOUS ONCE
Status: COMPLETED | OUTPATIENT
Start: 2021-02-01 | End: 2021-02-01

## 2021-02-01 RX ADMIN — Medication 10 ML: at 13:48

## 2021-02-01 RX ADMIN — IOHEXOL 50 ML: 240 INJECTION, SOLUTION INTRATHECAL; INTRAVASCULAR; INTRAVENOUS; ORAL at 13:48

## 2021-02-01 RX ADMIN — IOPAMIDOL 75 ML: 755 INJECTION, SOLUTION INTRAVENOUS at 13:48

## 2021-02-01 RX ADMIN — SODIUM CHLORIDE 80 ML: 9 INJECTION, SOLUTION INTRAVENOUS at 13:48

## 2021-02-03 ENCOUNTER — VIRTUAL VISIT (OUTPATIENT)
Dept: FAMILY MEDICINE CLINIC | Age: 31
End: 2021-02-03
Payer: COMMERCIAL

## 2021-02-03 DIAGNOSIS — R10.9 ABDOMINAL CRAMPING: Primary | ICD-10-CM

## 2021-02-03 DIAGNOSIS — E55.9 VITAMIN D DEFICIENCY: ICD-10-CM

## 2021-02-03 DIAGNOSIS — K62.5 RECTAL BLEEDING: ICD-10-CM

## 2021-02-03 DIAGNOSIS — R63.4 WEIGHT LOSS: ICD-10-CM

## 2021-02-03 PROCEDURE — 99213 OFFICE O/P EST LOW 20 MIN: CPT | Performed by: NURSE PRACTITIONER

## 2021-02-03 RX ORDER — ERGOCALCIFEROL 1.25 MG/1
50000 CAPSULE ORAL
Qty: 24 CAPSULE | Refills: 0 | Status: SHIPPED | OUTPATIENT
Start: 2021-02-04 | End: 2021-11-29 | Stop reason: ALTCHOICE

## 2021-02-03 ASSESSMENT — ENCOUNTER SYMPTOMS
VOMITING: 0
SORE THROAT: 0
BLOOD IN STOOL: 1
CONSTIPATION: 0
COUGH: 0
BACK PAIN: 0
NAUSEA: 0
DIARRHEA: 1
ABDOMINAL PAIN: 1
SHORTNESS OF BREATH: 0
RHINORRHEA: 0
ABDOMINAL DISTENTION: 0
ANAL BLEEDING: 1
CHEST TIGHTNESS: 0

## 2021-02-03 NOTE — PROGRESS NOTES
Tracie Tate, APRN-CNP  [facility]  [id number] [address], [address]  [address] [address]  Dept: [phone]  Dept Fax: [phone]     Patient ID: Obi Avalos is a 35 y.o. male. HPI    Established pt here today via virtual visit for f/u on abdominal pain and rectal bleeding. He relates that he continues to have intermittent bleeding. He knows the days when he is going to bleed because his stomach \"acts up. \"  He has had similar complaints for 2-3 years. He did see a GI specialist in 2017 and had an EGD and Colonoscopy. The results were reviewed and discussed together during this visit. The colonoscopy report reported a 7 mm polyp that was removed and sent for pathology. It was reported to be a hyperplastic polyp. EGD report reported Grade B reflux esophagitis, an irregular z-line and mild inflammation, erythema and granularity was found in the entire stomach. Biopsies were taken and sent revealing superficial gastritis associated with H-pylori otherwise negative. He was treated with PPI. Pt denies any fever or chills. Pt today denies any HA, chest pain, or SOB. Pt denies any N/V/C or abdominal pain. He denies any hemorrhoids but unsure if he has internal hemorrhoids. He relates that his bowels are very loose, going multiple times a day. He denies any major alcohol use. Relates he drank more in his younger days in college, but its maybe once a month now. He denies any regular NSAID use. He does relate that he has lost \"a lot\" of weight. His wife is present during this visit and relates that family members are starting to make comments about his weight loss. The patient's past medical, surgical, social, and family history as well as current medications and allergies were reviewed as documented in today's encounter by CARLY Thompson. My previous office notes, labs and diagnostic studies were reviewed prior to and during encounter. Constitutional:       General: He is not in acute distress. Appearance: Normal appearance. He is not ill-appearing or toxic-appearing. Pulmonary:      Effort: No tachypnea or accessory muscle usage. Comments: Patient able to talk in full sentences without difficulty   Neurological:      General: No focal deficit present. Mental Status: He is alert and oriented to person, place, and time. Psychiatric:         Mood and Affect: Mood normal.         Speech: Speech normal.         Behavior: Behavior normal. Behavior is cooperative. Assessment:      Diagnosis Orders   1. Abdominal cramping  Marck Mayorga MD, Gastroenterology, [place]   2. Rectal bleeding     3. Weight loss  Marck Mayorga MD, Gastroenterology, [place]   4. Vitamin D deficiency  vitamin D (ERGOCALCIFEROL) 1.25 MG (45068 UT) CAPS capsule    Tara - Shen Norris MD, Gastroenterology, [place]     Plan:     1. Abdominal cramping  2. Rectal bleeding  3. Weight loss  - CT reviewed- negative  - CRP slightly elevated  - Sed Rate normal  - WBC/Hgb normal  - continues to have bleeding  - Previous EGD/Colon reviewed  - Continue PPI  - ? Internal hemorrhoids   - Will refer to Dr. Viki Lee for recommendations  - Marck Mayorga MD, Gastroenterology, Malik Wood MD, Gastroenterology, [place]    4. Vitamin D deficiency  - Vitamin D is low, I have sent a twice weekly supplement for 12 weeks of 50,000 U of vitamin D  - Will re-check after completion    - Please increase foods with Vitamin D, which include cheese, eggs, cereals, yogurt, milk, tofu, any type of beef, salmon or tuna,  spinach, and mushroom. .  - vitamin D (ERGOCALCIFEROL) 1.25 MG (32096 UT) CAPS capsule; Take 1 capsule by mouth Twice a Week for 24 doses  Dispense: 24 capsule;  Refill: 0  - Marck Mayorga MD, Gastroenterology, [facility]

## 2021-02-03 NOTE — PROGRESS NOTES
Visit Information    Have you changed or started any medications since your last visit including any over-the-counter medicines, vitamins, or herbal medicines? no   Are you having any side effects from any of your medications? -  no  Have you stopped taking any of your medications? Is so, why? -  no    Have you seen any other physician or provider since your last visit? No  Have you had any other diagnostic tests since your last visit? Yes - Records Obtained  Have you been seen in the emergency room and/or had an admission to a hospital since we last saw you? No  Have you had your routine dental cleaning in the past 6 months? no    Have you activated your [employer] account? If not, what are your barriers?  Yes     Patient Care Team:  DEJAN Torre NP as PCP - General (Nurse Practitioner)  DEJAN Torre NP as PCP - [facility] Provider    Medical History Review  Past Medical, Family, and Social History reviewed and does not contribute to the patient presenting condition    Health Maintenance   Topic Date Due    Flu vaccine (1) 10/16/2021 (Originally 9/1/2020)    Hepatitis C screen  01/12/2022 (Originally 1991)    HIV screen  02/03/2022 (Originally 5/16/2006)    TSH testing  09/28/2021    DTaP/Tdap/Td vaccine (7 - Tdap) 01/20/2025    Hib vaccine  Completed    Hepatitis A vaccine  Aged Out    Hepatitis B vaccine  Aged Out    Meningococcal (ACWY) vaccine  Aged Out    Pneumococcal 0-64 years Vaccine  Aged Out    Varicella vaccine  Discontinued

## 2021-02-18 ENCOUNTER — TELEPHONE (OUTPATIENT)
Dept: GASTROENTEROLOGY | Age: 31
End: 2021-02-18

## 2021-02-18 NOTE — TELEPHONE ENCOUNTER
Attempt # 1 called to schedule appointment per referral left message for patient to call back     Attempt # 2 mailing letter also

## 2021-03-17 ENCOUNTER — OFFICE VISIT (OUTPATIENT)
Dept: GASTROENTEROLOGY | Age: 31
End: 2021-03-17
Payer: COMMERCIAL

## 2021-03-17 VITALS
DIASTOLIC BLOOD PRESSURE: 80 MMHG | BODY MASS INDEX: 28.84 KG/M2 | WEIGHT: 201 LBS | HEART RATE: 72 BPM | SYSTOLIC BLOOD PRESSURE: 131 MMHG

## 2021-03-17 DIAGNOSIS — K52.9 CHRONIC DIARRHEA: ICD-10-CM

## 2021-03-17 DIAGNOSIS — B96.81 HELICOBACTER PYLORI GASTRITIS: Primary | ICD-10-CM

## 2021-03-17 DIAGNOSIS — K29.70 HELICOBACTER PYLORI GASTRITIS: Primary | ICD-10-CM

## 2021-03-17 DIAGNOSIS — K62.5 RECTAL BLEEDING: ICD-10-CM

## 2021-03-17 PROCEDURE — 99204 OFFICE O/P NEW MOD 45 MIN: CPT | Performed by: INTERNAL MEDICINE

## 2021-03-17 ASSESSMENT — ENCOUNTER SYMPTOMS
VOMITING: 1
EYES NEGATIVE: 1
VOICE CHANGE: 0
SINUS PRESSURE: 0
ABDOMINAL PAIN: 1
COUGH: 0
RESPIRATORY NEGATIVE: 1
BLOOD IN STOOL: 1
ABDOMINAL DISTENTION: 0
DIARRHEA: 1
RECTAL PAIN: 0
WHEEZING: 0
CHOKING: 0
BACK PAIN: 0
TROUBLE SWALLOWING: 0
ANAL BLEEDING: 0
NAUSEA: 1
SORE THROAT: 0
CONSTIPATION: 0

## 2021-03-17 NOTE — PROGRESS NOTES
Reason for Referral:   DEJAN Dang - ROXANNE  [address] [place] [address] [address]    Chief Complaint   Patient presents with    New Patient     LAst 4 years he has had loos stool with blood in his stool. He goes many many times a day . Colon/EGD done in 2018  at [facility].     Nausea & Vomiting     Sometimes he even gets sick in the morning  and they have done may tests and can't figure it out. HISTORY OF PRESENT ILLNESS: aKren Leon is a 35 y.o. male , referred for evaluation of diarrhea and rectal bleeding. He has had issues for several years. Probably 3 years. No clear triggers. He reports at least 7 bowel movements per day. Sometimes at night. Blood mixed with the stool. Some mucus. No skin rash or joint swelling. No oral ulcers. EGD and colonoscopy with biopsies were negative in 2019. Patient reports no family history of GI pathology. He does not smoke. Occasional alcohol. No prior abdominal surgery. Past Medical,Family, and Social History reviewed and does not contribute to the patient presentingcondition. Patient's PMH/PSH,SH,PSYCH Hx, MEDs, ALLERGIES, and ROS were all reviewed and updated in the appropriate sections.     PAST MEDICAL HISTORY:  Past Medical History:   Diagnosis Date    Helicobacter pylori (H. pylori) infection        Past Surgical History:   Procedure Laterality Date    COLONOSCOPY  07/2019    UPPER GASTROINTESTINAL ENDOSCOPY  07/2019       CURRENT MEDICATIONS:    Current Outpatient Medications:     vitamin D (ERGOCALCIFEROL) 1.25 MG (89003 UT) CAPS capsule, Take 1 capsule by mouth Twice a Week for 24 doses, Disp: 24 capsule, Rfl: 0    venlafaxine (EFFEXOR XR) 37.5 MG extended release capsule, Take 1 capsule by mouth daily May increase to 75 mg daily in 2-3 weeks if needed, Disp: 90 capsule, Rfl: 0    pantoprazole (PROTONIX) 40 MG tablet, Take 1 tablet by mouth every morning (before breakfast), Disp: 30 tablet, Rfl: 0    fluticasone (FLONASE) 50 MCG/ACT nasal spray, 2 sprays by Each Nostril route daily, Disp: 1 Bottle, Rfl: 0    ALLERGIES:   Allergies   Allergen Reactions    Finasteride      Mood changes       FAMILY HISTORY:       Problem Relation Age of Onset    Heart Disease Paternal Uncle     Heart Disease Paternal Grandfather          SOCIAL HISTORY:   Social History     Socioeconomic History    Marital status:      Spouse name: Not on file    Number of children: Not on file    Years of education: Not on file    Highest education level: Not on file   Occupational History    Not on file   Social Needs    Financial resource strain: Not on file    Food insecurity     Worry: Not on file     Inability: Not on file    Transportation needs     Medical: Not on file     Non-medical: Not on file   Tobacco Use    Smoking status: Never Smoker    Smokeless tobacco: Never Used   Substance and Sexual Activity    Alcohol use: Yes    Drug use: Never    Sexual activity: Not on file   Lifestyle    Physical activity     Days per week: Not on file     Minutes per session: Not on file    Stress: Not on file   Relationships    Social connections     Talks on phone: Not on file     Gets together: Not on file     Attends Pentecostalism service: Not on file     Active member of club or organization: Not on file     Attends meetings of clubs or organizations: Not on file     Relationship status: Not on file    Intimate partner violence     Fear of current or ex partner: Not on file     Emotionally abused: Not on file     Physically abused: Not on file     Forced sexual activity: Not on file   Other Topics Concern    Not on file   Social History Narrative    Not on file       REVIEW OF SYSTEMS: A 12-point review of systemswas obtained and pertinent positives and negatives were enumerated above in the history of present illness. All other reviewed systems / symptoms were negative.     Review of Systems Constitutional: Negative. Negative for appetite change, fatigue and unexpected weight change. HENT: Negative. Negative for dental problem, postnasal drip, sinus pressure, sore throat, trouble swallowing and voice change. Eyes: Negative. Negative for visual disturbance. Respiratory: Negative. Negative for cough, choking and wheezing. Cardiovascular: Negative. Negative for chest pain, palpitations and leg swelling. Gastrointestinal: Positive for abdominal pain, blood in stool, diarrhea, nausea and vomiting. Negative for abdominal distention, anal bleeding, constipation and rectal pain. Endocrine: Negative. Genitourinary: Negative. Negative for difficulty urinating. Musculoskeletal: Negative. Negative for arthralgias, back pain, gait problem and myalgias. Skin: Negative. Allergic/Immunologic: Negative for environmental allergies and food allergies. Neurological: Negative. Negative for dizziness, weakness, light-headedness, numbness and headaches. Hematological: Negative. Does not bruise/bleed easily. Psychiatric/Behavioral: Positive for sleep disturbance. The patient is not nervous/anxious. LABORATORY DATA: Reviewed  Lab Results   Component Value Date    WBC 7.2 02/01/2021    HGB 15.1 02/01/2021    HCT 45.5 02/01/2021    MCV 90.6 02/01/2021     02/01/2021     02/01/2021    K 4.0 02/01/2021     02/01/2021    CO2 26 02/01/2021    BUN 14 02/01/2021    CREATININE 0.90 02/01/2021    LABALBU 4.7 02/01/2021    BILITOT 0.44 02/01/2021    ALKPHOS 61 02/01/2021    AST 18 02/01/2021    ALT 13 02/01/2021         Lab Results   Component Value Date    RBC 5.02 02/01/2021    HGB 15.1 02/01/2021    MCV 90.6 02/01/2021    MCH 30.1 02/01/2021    MCHC 33.2 02/01/2021    RDW 12.8 02/01/2021    MPV 9.3 02/01/2021         DIAGNOSTIC TESTING:     No results found. There were no vitals taken for this visit.     PHYSICAL EXAMINATION: Vital signs reviewed per the nursing documentation. There is no height or weight on file to calculate BMI. Physical Exam      I personally reviewed the nurse's notes and documentation and I agree with her notes. General: alert, appears stated age and cooperative Psych: Normal. and Alert and oriented, appropriate affect. . Normal affect. Mentation normal  HEENT: PERRLA. Clear conjunctivae and sclerae. Moist oral mucosae, no lesions or ulcers. The neck is supple, without lymphadenopathy or jugular venous distension. No masses. Normal thyroid. Cardiovascular: S1 S2 RRR no rubs or murmurs. Pulmonary: clear BL. No accessory muscle usage. Abdominal Exam: Soft, NT ND, no hepato or spleno megaly, +BS, no ascites. No groin masses or lymphadenopathy. Extremities: no lower ext edema. Skin: Warm skin. No skin rash. No spider nevi palmar erythema nail dystrophy. Joint: No joint swelling or deformity. Neurological: intact sensory. DTR+. IMPRESSION: Mr. Gina Marx is a 35 y.o. male with diarrhea and rectal bleeding. Negative EGD and colonoscopy previously. Very likely functional.  Trial of align. If this is not very successful he will try proactive Imodium. History of prior H. pylori infection status post treatment. No testing to confirm eradication. We will obtain H. pylori breath test.  If this is positive he will require treatment. Follow-up in 1 month. Spent 30 minutes providing patient education and counseling. Thank you for allowing me to participate in the care of Mr. Gina Marx. For any further questions please do not hesitate to contact me. I have reviewed and agree with the MA/LPN ROS. Note is dictated utilizing voice recognition software. Unfortunately this leads to occasional typographical errors. Please contact our office if you have any questions.     Lio Medrano MD  [facility]  O: #[phone]

## 2021-04-16 ENCOUNTER — HOSPITAL ENCOUNTER (OUTPATIENT)
Age: 31
Discharge: HOME | End: 2021-04-16
Payer: COMMERCIAL

## 2021-04-16 DIAGNOSIS — K62.5 RECTAL BLEEDING: ICD-10-CM

## 2021-04-16 DIAGNOSIS — B96.81 HELICOBACTER PYLORI GASTRITIS: ICD-10-CM

## 2021-04-16 DIAGNOSIS — K52.9 CHRONIC DIARRHEA: ICD-10-CM

## 2021-04-16 DIAGNOSIS — K29.70 HELICOBACTER PYLORI GASTRITIS: ICD-10-CM

## 2021-04-16 PROCEDURE — 83013 H PYLORI (C-13) BREATH: CPT

## 2021-04-16 PROCEDURE — 83014 H PYLORI DRUG ADMIN: CPT

## 2021-04-19 LAB — H PYLORI BREATH TEST: NEGATIVE

## 2021-04-21 ENCOUNTER — OFFICE VISIT (OUTPATIENT)
Dept: GASTROENTEROLOGY | Age: 31
End: 2021-04-21
Payer: COMMERCIAL

## 2021-04-21 VITALS
HEART RATE: 69 BPM | BODY MASS INDEX: 29.56 KG/M2 | WEIGHT: 206 LBS | DIASTOLIC BLOOD PRESSURE: 75 MMHG | SYSTOLIC BLOOD PRESSURE: 104 MMHG

## 2021-04-21 DIAGNOSIS — K52.9 CHRONIC DIARRHEA: Primary | ICD-10-CM

## 2021-04-21 PROCEDURE — 99213 OFFICE O/P EST LOW 20 MIN: CPT | Performed by: INTERNAL MEDICINE

## 2021-04-21 RX ORDER — LOPERAMIDE HYDROCHLORIDE 2 MG/1
2 CAPSULE ORAL 4 TIMES DAILY PRN
COMMUNITY

## 2021-04-21 RX ORDER — PANTOPRAZOLE SODIUM 20 MG/1
20 TABLET, DELAYED RELEASE ORAL DAILY
Qty: 30 TABLET | Refills: 3 | Status: SHIPPED | OUTPATIENT
Start: 2021-04-21 | End: 2021-11-29 | Stop reason: ALTCHOICE

## 2021-04-21 RX ORDER — GREEN TEA/HOODIA GORDONII 315-12.5MG
1 CAPSULE ORAL DAILY
COMMUNITY

## 2021-04-21 ASSESSMENT — ENCOUNTER SYMPTOMS
SORE THROAT: 0
NAUSEA: 1
ABDOMINAL DISTENTION: 0
DIARRHEA: 1
ABDOMINAL PAIN: 1
CHOKING: 0
CONSTIPATION: 0
EYES NEGATIVE: 1
BACK PAIN: 0
WHEEZING: 0
ANAL BLEEDING: 0
VOICE CHANGE: 0
BLOOD IN STOOL: 1
RECTAL PAIN: 0
RESPIRATORY NEGATIVE: 1
COUGH: 0
SINUS PRESSURE: 0
TROUBLE SWALLOWING: 0
VOMITING: 1

## 2021-04-21 NOTE — PROGRESS NOTES
on file    Highest education level: Not on file   Occupational History    Not on file   Social Needs    Financial resource strain: Not on file    Food insecurity     Worry: Not on file     Inability: Not on file    Transportation needs     Medical: Not on file     Non-medical: Not on file   Tobacco Use    Smoking status: Never Smoker    Smokeless tobacco: Never Used   Substance and Sexual Activity    Alcohol use: Yes    Drug use: Never    Sexual activity: Not on file   Lifestyle    Physical activity     Days per week: Not on file     Minutes per session: Not on file    Stress: Not on file   Relationships    Social connections     Talks on phone: Not on file     Gets together: Not on file     Attends Episcopalian service: Not on file     Active member of club or organization: Not on file     Attends meetings of clubs or organizations: Not on file     Relationship status: Not on file    Intimate partner violence     Fear of current or ex partner: Not on file     Emotionally abused: Not on file     Physically abused: Not on file     Forced sexual activity: Not on file   Other Topics Concern    Not on file   Social History Narrative    Not on file       REVIEW OF SYSTEMS: A 12-point review of systemswas obtained and pertinent positives and negatives were enumerated above in the history of present illness. All other reviewed systems / symptoms were negative. Review of Systems   Constitutional: Negative. Negative for appetite change, fatigue and unexpected weight change. HENT: Negative. Negative for dental problem, postnasal drip, sinus pressure, sore throat, trouble swallowing and voice change. Eyes: Negative. Negative for visual disturbance. Respiratory: Negative. Negative for cough, choking and wheezing. Cardiovascular: Negative. Negative for chest pain, palpitations and leg swelling. Gastrointestinal: Positive for abdominal pain, blood in stool, diarrhea, nausea and vomiting.  Negative for abdominal distention, anal bleeding, constipation and rectal pain. Endocrine: Negative. Genitourinary: Negative. Negative for difficulty urinating. Musculoskeletal: Negative. Negative for arthralgias, back pain, gait problem and myalgias. Skin: Negative. Allergic/Immunologic: Negative for environmental allergies and food allergies. Neurological: Negative. Negative for dizziness, weakness, light-headedness, numbness and headaches. Hematological: Negative. Does not bruise/bleed easily. Psychiatric/Behavioral: Positive for sleep disturbance. The patient is not nervous/anxious. LABORATORY DATA: Reviewed  Lab Results   Component Value Date    WBC 7.2 02/01/2021    HGB 15.1 02/01/2021    HCT 45.5 02/01/2021    MCV 90.6 02/01/2021     02/01/2021     02/01/2021    K 4.0 02/01/2021     02/01/2021    CO2 26 02/01/2021    BUN 14 02/01/2021    CREATININE 0.90 02/01/2021    LABALBU 4.7 02/01/2021    BILITOT 0.44 02/01/2021    ALKPHOS 61 02/01/2021    AST 18 02/01/2021    ALT 13 02/01/2021         Lab Results   Component Value Date    RBC 5.02 02/01/2021    HGB 15.1 02/01/2021    MCV 90.6 02/01/2021    MCH 30.1 02/01/2021    MCHC 33.2 02/01/2021    RDW 12.8 02/01/2021    MPV 9.3 02/01/2021         DIAGNOSTIC TESTING:     No results found. PHYSICAL EXAMINATION: Vital signs reviewed per the nursing documentation. There were no vitals taken for this visit. There is no height or weight on file to calculate BMI. Physical Exam      I personally reviewed the nurse's notes and documentation and I agree with her notes. General: alert, appears stated age and cooperative Psych: Normal. and Alert and oriented, appropriate affect. . Normal affect. Mentation normal  HEENT: PERRLA. Clear conjunctivae and sclerae. Moist oral mucosae, no lesions or ulcers. The neck is supple, without lymphadenopathy or jugular venous distension. No masses. Normal thyroid.    Cardiovascular: S1 S2 RRR no rubs or murmurs. Pulmonary: clear BL. No accessory muscle usage. Abdominal Exam: Soft, NT ND, no hepato or spleno megaly, +BS, no ascites. IMPRESSION: Mr. Shaista Laurent is a 35 y.o. male with diarrhea. Chronic. Negative EGD and colonoscopy at [address] [place] [address] last year. Biopsy did not show any pathology. We will refer to allergy for testing. Continue Imodium. We had excellent discussion with the patient and his wife in regards differential diagnosis. Trial of Protonix. Quit marijuana. Follow-up in 3 months. Thank you for allowing me to participate in the care of Mr. Shaista Laurent. For any further questions please do not hesitate to contact me. I have reviewed and agree with the ROS entered by the MA/LPN. Note is dictated utilizing voice recognition software. Unfortunately this leads to occasional typographical errors. Please contact our office if you have any questions.     Cody Ibrahim MD  [facility]  O: #[phone]

## 2021-05-10 DIAGNOSIS — F41.9 ANXIETY: ICD-10-CM

## 2021-05-10 RX ORDER — VENLAFAXINE HYDROCHLORIDE 37.5 MG/1
CAPSULE, EXTENDED RELEASE ORAL
Qty: 90 CAPSULE | Refills: 0 | Status: SHIPPED | OUTPATIENT
Start: 2021-05-10 | End: 2021-06-08 | Stop reason: ALTCHOICE

## 2021-06-08 ENCOUNTER — TELEMEDICINE (OUTPATIENT)
Dept: FAMILY MEDICINE CLINIC | Age: 31
End: 2021-06-08
Payer: COMMERCIAL

## 2021-06-08 DIAGNOSIS — G47.33 OSA (OBSTRUCTIVE SLEEP APNEA): ICD-10-CM

## 2021-06-08 DIAGNOSIS — F41.9 ANXIETY: Primary | ICD-10-CM

## 2021-06-08 PROCEDURE — 99213 OFFICE O/P EST LOW 20 MIN: CPT | Performed by: NURSE PRACTITIONER

## 2021-06-08 RX ORDER — VENLAFAXINE HYDROCHLORIDE 37.5 MG/1
37.5 CAPSULE, EXTENDED RELEASE ORAL DAILY
Qty: 90 CAPSULE | Refills: 1 | Status: SHIPPED | OUTPATIENT
Start: 2021-06-08 | End: 2021-06-20 | Stop reason: SDUPTHER

## 2021-06-08 RX ORDER — VENLAFAXINE HYDROCHLORIDE 37.5 MG/1
CAPSULE, EXTENDED RELEASE ORAL
Qty: 90 CAPSULE | Refills: 1 | Status: CANCELLED | OUTPATIENT
Start: 2021-06-08

## 2021-06-08 SDOH — ECONOMIC STABILITY: FOOD INSECURITY: WITHIN THE PAST 12 MONTHS, YOU WORRIED THAT YOUR FOOD WOULD RUN OUT BEFORE YOU GOT MONEY TO BUY MORE.: NEVER TRUE

## 2021-06-08 SDOH — ECONOMIC STABILITY: FOOD INSECURITY: WITHIN THE PAST 12 MONTHS, THE FOOD YOU BOUGHT JUST DIDN'T LAST AND YOU DIDN'T HAVE MONEY TO GET MORE.: NEVER TRUE

## 2021-06-08 ASSESSMENT — ENCOUNTER SYMPTOMS
NAUSEA: 0
ABDOMINAL DISTENTION: 0
DIARRHEA: 0
SORE THROAT: 0
VOMITING: 0
CHEST TIGHTNESS: 0
BACK PAIN: 0
RHINORRHEA: 0
SHORTNESS OF BREATH: 0
COUGH: 0
ABDOMINAL PAIN: 0
CONSTIPATION: 0
APNEA: 1

## 2021-06-08 ASSESSMENT — SOCIAL DETERMINANTS OF HEALTH (SDOH): HOW HARD IS IT FOR YOU TO PAY FOR THE VERY BASICS LIKE FOOD, HOUSING, MEDICAL CARE, AND HEATING?: NOT HARD AT ALL

## 2021-06-08 NOTE — PROGRESS NOTES
Gema Schumacher, APRN-CNP  [facility]  [id number]  [address] [address]  Dept: [phone]  Dept Fax: [phone]     PATIENT ID: Karen Neely is a 28 y.o. male. HPI:  Established pt here presenting via virtual visit for f/u on anxiety and medication refills. He relates that things are going well on the Effexor. He is about to have a baby in July. Pt denies any fever or chills. Pt today denies any HA, chest pain, or SOB. Pt denies any N/V/D/C or abdominal pain. He does complain of ongoing fatigue. He says it doesn't matter if he sleeps 4 hours or 10 he is always tired. He does relate that approx. 2 years ago he had a sleep study and it did recommend wearing a CPAP. He did not every hear from [address], nor did he follow up on it. My previous office notes, labs and diagnostic studies were reviewed prior to and during encounter. The patient's past medical, surgical, social, and family history as well as current medications and allergies were reviewed as documented in today's encounter by CARLY Santiago. Current Outpatient Medications on File Prior to Visit   Medication Sig Dispense Refill    Probiotic Acidophilus (FLORANEX) TABS Take 1 tablet by mouth daily      loperamide (IMODIUM) 2 MG capsule Take 2 mg by mouth 4 times daily as needed for Diarrhea      pantoprazole (PROTONIX) 20 MG tablet Take 1 tablet by mouth daily 30 tablet 3    vitamin D (ERGOCALCIFEROL) 1.25 MG (32542 UT) CAPS capsule Take 1 capsule by mouth Twice a Week for 24 doses 24 capsule 0     No current facility-administered medications on file prior to visit. SUBJECTIVE:     Review of Systems   Constitutional: Positive for fatigue (had positive sleep study 2 years ago). Negative for activity change and fever. HENT: Negative for congestion, ear pain, rhinorrhea and sore throat. Respiratory: Positive for apnea.  Negative for cough, chest tightness and shortness of breath. C/o snoring   Cardiovascular: Negative for chest pain and palpitations. Gastrointestinal: Negative for abdominal distention, abdominal pain, constipation, diarrhea, nausea and vomiting. Endocrine: Negative for polydipsia, polyphagia and polyuria. Genitourinary: Negative for difficulty urinating and dysuria. Musculoskeletal: Negative for arthralgias, back pain and myalgias. Skin: Negative for rash. Neurological: Negative for dizziness, weakness, light-headedness and headaches. Hematological: Negative for adenopathy. Psychiatric/Behavioral: Negative for agitation and behavioral problems. The patient is nervous/anxious (stable on Effexor). OBJECTIVE:  There were no vitals taken for this visit. Physical Exam  Vitals reviewed: Vital signs unavailable, as this is a virtual visit. Constitutional:       General: He is not in acute distress. Appearance: Normal appearance. He is not ill-appearing or toxic-appearing. Pulmonary:      Effort: No tachypnea or accessory muscle usage. Comments: Patient able to talk in full sentences without difficulty   Neurological:      General: No focal deficit present. Mental Status: He is alert and oriented to person, place, and time. Psychiatric:         Mood and Affect: Mood normal.         Speech: Speech normal.         Behavior: Behavior normal. Behavior is cooperative. ASSESSMENT:   Diagnosis Orders   1. Anxiety  venlafaxine (EFFEXOR XR) 37.5 MG extended release capsule   2. SHARMAINE (obstructive sleep apnea)       PLAN:  1. Anxiety  - Stable: Medication re-filled as needed, con't medications as prescribed, con't current tx plan  - Continue with Effexor 37.5 mg daily as previously prescribed. - Pt encouraged to deep breath and relax through anxious moments   - Offered reassurance and allowed to ventilate feelings and ask questions.   - Non-pharmological coping methods discussed.       2. SHARMAINE

## 2021-06-11 DIAGNOSIS — G47.19 EXCESSIVE DAYTIME SLEEPINESS: Primary | ICD-10-CM

## 2021-06-11 DIAGNOSIS — R06.83 SNORING: ICD-10-CM

## 2021-07-08 ENCOUNTER — TELEPHONE (OUTPATIENT)
Dept: GASTROENTEROLOGY | Age: 31
End: 2021-07-08

## 2021-07-30 ENCOUNTER — OFFICE VISIT (OUTPATIENT)
Dept: FAMILY MEDICINE CLINIC | Age: 31
End: 2021-07-30

## 2021-07-30 VITALS
BODY MASS INDEX: 27.77 KG/M2 | WEIGHT: 198.4 LBS | DIASTOLIC BLOOD PRESSURE: 89 MMHG | SYSTOLIC BLOOD PRESSURE: 134 MMHG | TEMPERATURE: 97.5 F | HEIGHT: 71 IN

## 2021-07-30 DIAGNOSIS — K62.5 RECTAL BLEEDING: ICD-10-CM

## 2021-07-30 DIAGNOSIS — F41.9 ANXIETY: Primary | ICD-10-CM

## 2021-07-30 DIAGNOSIS — F33.2 SEVERE EPISODE OF RECURRENT MAJOR DEPRESSIVE DISORDER, WITHOUT PSYCHOTIC FEATURES (HCC): ICD-10-CM

## 2021-07-30 PROCEDURE — 99213 OFFICE O/P EST LOW 20 MIN: CPT | Performed by: NURSE PRACTITIONER

## 2021-07-30 ASSESSMENT — ENCOUNTER SYMPTOMS
COUGH: 0
ABDOMINAL DISTENTION: 0
SORE THROAT: 0
BACK PAIN: 0
SHORTNESS OF BREATH: 0
CHEST TIGHTNESS: 0
RHINORRHEA: 0
CONSTIPATION: 0
ABDOMINAL PAIN: 0
DIARRHEA: 1
NAUSEA: 0
VOMITING: 0

## 2021-07-30 NOTE — PROGRESS NOTES
pain, constipation, nausea and vomiting. No recent rectal bleeding. Has been following with Dr. Eddie Moeller   Endocrine: Negative for polydipsia, polyphagia and polyuria. Genitourinary: Negative for difficulty urinating and dysuria. Musculoskeletal: Negative for arthralgias, back pain and myalgias. Skin: Negative for rash. Neurological: Negative for dizziness, weakness, light-headedness and headaches. Hematological: Negative for adenopathy. Psychiatric/Behavioral: Positive for dysphoric mood (stable; would like to wean himself off of the Effexor). Negative for agitation and behavioral problems. The patient is nervous/anxious (stable but would like to wean himself off of the Effexor). OBJECTIVE:  /89   Temp 97.5 °F (36.4 °C)   Ht 5' 11\" (1.803 m)   Wt 198 lb 6.4 oz (90 kg)   BMI 27.67 kg/m²     Physical Exam  Vitals reviewed. Constitutional:       General: He is not in acute distress. Appearance: Normal appearance. He is well-developed. HENT:      Head: Normocephalic and atraumatic. Right Ear: Hearing and external ear normal.      Left Ear: Hearing and external ear normal.      Nose: Nose normal. No congestion. Right Sinus: No maxillary sinus tenderness or frontal sinus tenderness. Left Sinus: No maxillary sinus tenderness or frontal sinus tenderness. Mouth/Throat:      Lips: Pink. No lesions. Mouth: Mucous membranes are moist. No oral lesions. Tongue: No lesions. Pharynx: Oropharynx is clear. No oropharyngeal exudate or posterior oropharyngeal erythema. Eyes:      Extraocular Movements: Extraocular movements intact. Conjunctiva/sclera: Conjunctivae normal.      Pupils: Pupils are equal, round, and reactive to light. Neck:      Thyroid: No thyroid mass. Cardiovascular:      Rate and Rhythm: Normal rate and regular rhythm. Pulses: Normal pulses. Heart sounds: Normal heart sounds. No murmur heard.      Pulmonary:      Effort: Pulmonary effort is normal. No respiratory distress. Breath sounds: Normal breath sounds and air entry. No wheezing, rhonchi or rales. Abdominal:      General: Bowel sounds are normal. There is no distension. Palpations: Abdomen is soft. Tenderness: There is no abdominal tenderness. Musculoskeletal:         General: Normal range of motion. Cervical back: Full passive range of motion without pain and normal range of motion. Right lower leg: No edema. Left lower leg: No edema. Lymphadenopathy:      Cervical: No cervical adenopathy. Skin:     General: Skin is warm and dry. Capillary Refill: Capillary refill takes less than 2 seconds. Findings: No rash. Neurological:      General: No focal deficit present. Mental Status: He is alert and oriented to person, place, and time. Deep Tendon Reflexes: Reflexes normal.   Psychiatric:         Attention and Perception: Attention and perception normal.         Mood and Affect: Mood and affect normal.         Speech: Speech normal.         Behavior: Behavior normal. Behavior is cooperative. Cognition and Memory: Memory normal.       ASSESSMENT:   Diagnosis Orders   1. Anxiety     2. Severe episode of recurrent major depressive disorder, without psychotic features ([address]     3. Rectal bleeding       PLAN:  1. Anxiety  2. Severe episode of recurrent major depressive disorder, without psychotic features ([address]  - Doing well; wants to wean himself off of the Effexor  - Will have him cut the tablet in half for 2 weeks then half for every other day for 2 weeks. May need to then take every third day to completely wean off    3. Rectal bleeding  - Stable: Medication re-filled as needed, con't medications as prescribed, con't current tx plan   - Continue with Immodium as previously prescribed. - Continue with Probiotic as previously prescribed.    - Will cont to follow with Dr. Andrea Ayers as instructed     - Rest of systems unchanged, continue current treatments. - On this date July 30, 2021,  I have spent greater than 50% of this visit reviewing previous notes, test results and/or face to face with the patient discussing the diagnoses, importance of compliance with the treatment plan, counseling, coordinating care as well as documenting on the day of the visit.      Cristine Kaminski, APRN-CNP

## 2021-11-01 ENCOUNTER — PATIENT MESSAGE (OUTPATIENT)
Dept: FAMILY MEDICINE CLINIC | Age: 31
End: 2021-11-01

## 2021-11-01 DIAGNOSIS — F41.9 ANXIETY: ICD-10-CM

## 2021-11-01 RX ORDER — VENLAFAXINE HYDROCHLORIDE 37.5 MG/1
37.5 CAPSULE, EXTENDED RELEASE ORAL DAILY
Qty: 90 CAPSULE | Refills: 1 | Status: SHIPPED | OUTPATIENT
Start: 2021-11-01 | End: 2021-12-16 | Stop reason: SDUPTHER

## 2021-11-01 NOTE — TELEPHONE ENCOUNTER
From: Brandyn An  To: DEJAN Moncada NP  Sent: 11/1/2021 2:18 PM EDT  Subject: Prescription Question    Hi! I hope you had a great, but spooky Halloween! I have 2 pills left. Was hoping a could get my script of the Effexor refilled. Thanks!

## 2021-11-29 ENCOUNTER — OFFICE VISIT (OUTPATIENT)
Dept: FAMILY MEDICINE CLINIC | Age: 31
End: 2021-11-29
Payer: COMMERCIAL

## 2021-11-29 ENCOUNTER — HOSPITAL ENCOUNTER (OUTPATIENT)
Age: 31
Setting detail: SPECIMEN
Discharge: HOME | End: 2021-11-29

## 2021-11-29 VITALS
HEART RATE: 95 BPM | BODY MASS INDEX: 27.06 KG/M2 | OXYGEN SATURATION: 97 % | WEIGHT: 194 LBS | SYSTOLIC BLOOD PRESSURE: 120 MMHG | TEMPERATURE: 98 F | DIASTOLIC BLOOD PRESSURE: 88 MMHG

## 2021-11-29 DIAGNOSIS — Z00.00 PREVENTATIVE HEALTH CARE: ICD-10-CM

## 2021-11-29 DIAGNOSIS — K58.0 IRRITABLE BOWEL SYNDROME WITH DIARRHEA: ICD-10-CM

## 2021-11-29 DIAGNOSIS — E55.9 VITAMIN D DEFICIENCY: ICD-10-CM

## 2021-11-29 DIAGNOSIS — K62.5 RECTAL BLEEDING: Primary | ICD-10-CM

## 2021-11-29 DIAGNOSIS — K62.5 RECTAL BLEEDING: ICD-10-CM

## 2021-11-29 DIAGNOSIS — G43.911 INTRACTABLE MIGRAINE WITH STATUS MIGRAINOSUS, UNSPECIFIED MIGRAINE TYPE: ICD-10-CM

## 2021-11-29 LAB
ALBUMIN SERPL-MCNC: 5 G/DL (ref 3.5–5.2)
ALBUMIN/GLOBULIN RATIO: 2 (ref 1–2.5)
ALP BLD-CCNC: 70 U/L (ref 40–129)
ALT SERPL-CCNC: 23 U/L (ref 5–41)
ANION GAP SERPL CALCULATED.3IONS-SCNC: 19 MMOL/L (ref 9–17)
AST SERPL-CCNC: 44 U/L
BILIRUB SERPL-MCNC: 1.04 MG/DL (ref 0.3–1.2)
BUN BLDV-MCNC: 14 MG/DL (ref 6–20)
BUN/CREAT BLD: ABNORMAL (ref 9–20)
C-REACTIVE PROTEIN: <3 MG/L (ref 0–5)
CALCIUM SERPL-MCNC: 10.3 MG/DL (ref 8.6–10.4)
CHLORIDE BLD-SCNC: 103 MMOL/L (ref 98–107)
CHOLESTEROL/HDL RATIO: 3.7
CHOLESTEROL: 200 MG/DL
CO2: 20 MMOL/L (ref 20–31)
CREAT SERPL-MCNC: 0.89 MG/DL (ref 0.7–1.2)
GFR AFRICAN AMERICAN: >60 ML/MIN
GFR NON-AFRICAN AMERICAN: >60 ML/MIN
GFR SERPL CREATININE-BSD FRML MDRD: ABNORMAL ML/MIN/{1.73_M2}
GFR SERPL CREATININE-BSD FRML MDRD: ABNORMAL ML/MIN/{1.73_M2}
GLUCOSE BLD-MCNC: 115 MG/DL (ref 70–99)
HCT VFR BLD CALC: 45.2 % (ref 40.7–50.3)
HDLC SERPL-MCNC: 54 MG/DL
HEMOGLOBIN: 15.7 G/DL (ref 13–17)
LDL CHOLESTEROL: 121 MG/DL (ref 0–130)
MCH RBC QN AUTO: 31.2 PG (ref 25.2–33.5)
MCHC RBC AUTO-ENTMCNC: 34.7 G/DL (ref 28.4–34.8)
MCV RBC AUTO: 89.7 FL (ref 82.6–102.9)
NRBC AUTOMATED: 0 PER 100 WBC
PDW BLD-RTO: 12.7 % (ref 11.8–14.4)
PLATELET # BLD: 348 K/UL (ref 138–453)
PMV BLD AUTO: 9.5 FL (ref 8.1–13.5)
POTASSIUM SERPL-SCNC: 3.9 MMOL/L (ref 3.7–5.3)
RBC # BLD: 5.04 M/UL (ref 4.21–5.77)
SEDIMENTATION RATE, ERYTHROCYTE: 6 MM (ref 0–15)
SODIUM BLD-SCNC: 142 MMOL/L (ref 135–144)
TOTAL PROTEIN: 7.5 G/DL (ref 6.4–8.3)
TRIGL SERPL-MCNC: 127 MG/DL
VITAMIN D 25-HYDROXY: 28.6 NG/ML (ref 30–100)
VLDLC SERPL CALC-MCNC: ABNORMAL MG/DL (ref 1–30)
WBC # BLD: 9.3 K/UL (ref 3.5–11.3)

## 2021-11-29 PROCEDURE — 1036F TOBACCO NON-USER: CPT | Performed by: NURSE PRACTITIONER

## 2021-11-29 PROCEDURE — G8484 FLU IMMUNIZE NO ADMIN: HCPCS | Performed by: NURSE PRACTITIONER

## 2021-11-29 PROCEDURE — G8419 CALC BMI OUT NRM PARAM NOF/U: HCPCS | Performed by: NURSE PRACTITIONER

## 2021-11-29 PROCEDURE — G8427 DOCREV CUR MEDS BY ELIG CLIN: HCPCS | Performed by: NURSE PRACTITIONER

## 2021-11-29 PROCEDURE — 99214 OFFICE O/P EST MOD 30 MIN: CPT | Performed by: NURSE PRACTITIONER

## 2021-11-29 RX ORDER — SUMATRIPTAN 50 MG/1
50 TABLET, FILM COATED ORAL
Qty: 9 TABLET | Refills: 3 | Status: SHIPPED | OUTPATIENT
Start: 2021-11-29 | End: 2021-11-29

## 2021-11-29 RX ORDER — PANTOPRAZOLE SODIUM 20 MG/1
20 TABLET, DELAYED RELEASE ORAL DAILY
Qty: 90 TABLET | Refills: 0 | Status: SHIPPED | OUTPATIENT
Start: 2021-11-29 | End: 2022-02-27

## 2021-11-29 RX ORDER — CYCLOBENZAPRINE HCL 10 MG
10 TABLET ORAL 3 TIMES DAILY PRN
Qty: 30 TABLET | Refills: 0 | Status: SHIPPED | OUTPATIENT
Start: 2021-11-29 | End: 2021-12-09

## 2021-11-29 ASSESSMENT — ENCOUNTER SYMPTOMS
DIARRHEA: 1
VOMITING: 0
ABDOMINAL PAIN: 0
CHEST TIGHTNESS: 0
SORE THROAT: 0
CONSTIPATION: 0
SHORTNESS OF BREATH: 0
COUGH: 0
BACK PAIN: 0
ABDOMINAL DISTENTION: 0
RHINORRHEA: 0
NAUSEA: 0
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
## 2021-11-30 DIAGNOSIS — E55.9 VITAMIN D DEFICIENCY: Primary | ICD-10-CM

## 2021-11-30 LAB
ALLERGEN BARLEY IGE: 0.39 KU/L (ref 0–0.34)
ALLERGEN BEEF: <0.1 KU/L (ref 0–0.34)
ALLERGEN CABBAGE IGE: 0.41 KU/L (ref 0–0.34)
ALLERGEN CARROT IGE: 0.29 KU/L (ref 0–0.34)
ALLERGEN CHICKEN IGE: <0.1 KU/L (ref 0–0.34)
ALLERGEN CODFISH IGE: <0.1 KU/L (ref 0–0.34)
ALLERGEN CORN IGE: 0.35 KU/L (ref 0–0.34)
ALLERGEN COW MILK IGE: <0.1 KU/L (ref 0–0.34)
ALLERGEN CRAB IGE: 0.26 KU/L (ref 0–0.34)
ALLERGEN EGG WHITE IGE: <0.1 KU/L (ref 0–0.34)
ALLERGEN GRAPE IGE: 0.22 KU/L (ref 0–0.34)
ALLERGEN LETTUCE IGE: 0.28 KU/L (ref 0–0.34)
ALLERGEN NAVY BEAN: 0.33 KU/L (ref 0–0.34)
ALLERGEN OAT: 0.37 KU/L (ref 0–0.34)
ALLERGEN ORANGE IGE: 0.31 KU/L (ref 0–0.34)
ALLERGEN PEANUT (F13) IGE: 0.43 KU/L (ref 0–0.34)
ALLERGEN PEPPER C. ANNUUM IGE: 0.33 KU/L (ref 0–0.34)
ALLERGEN PORK: <0.1 KU/L (ref 0–0.34)
ALLERGEN RICE IGE: 0.4 KU/L (ref 0–0.34)
ALLERGEN RYE IGE: 0.41 KU/L (ref 0–0.34)
ALLERGEN SOYBEAN IGE: 0.31 KU/L (ref 0–0.34)
ALLERGEN TOMATO IGE: 0.38 KU/L (ref 0–0.34)
ALLERGEN TUNA IGE: <0.1 KU/L (ref 0–0.34)
ALLERGEN WHEAT IGE: 0.39 KU/L (ref 0–0.34)
ESTIMATED AVERAGE GLUCOSE: 97 MG/DL
HBA1C MFR BLD: 5 % (ref 4–6)
IGE: 63 IU/ML
POTATO, IGE: 0.37 KU/L (ref 0–0.34)
SHRIMP: <0.1 KU/L (ref 0–0.34)

## 2021-11-30 RX ORDER — ERGOCALCIFEROL 1.25 MG/1
50000 CAPSULE ORAL WEEKLY
Qty: 4 CAPSULE | Refills: 3 | Status: SHIPPED | OUTPATIENT
Start: 2021-11-30 | End: 2021-12-04

## 2021-12-01 LAB
GLIADIN DEAMINIDATED PEPTIDE AB IGA: 0.7 U/ML
GLIADIN DEAMINIDATED PEPTIDE AB IGG: <0.4 U/ML
IGA: 152 MG/DL (ref 70–400)
TISSUE TRANSGLUTAMINASE IGA: 0.3 U/ML

## 2021-12-02 ENCOUNTER — TELEPHONE (OUTPATIENT)
Dept: FAMILY MEDICINE CLINIC | Age: 31
End: 2021-12-02
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
## 2021-12-09 LAB — IBD PANEL: NORMAL

## 2021-12-16 ENCOUNTER — PATIENT MESSAGE (OUTPATIENT)
Dept: FAMILY MEDICINE CLINIC | Age: 31
End: 2021-12-16

## 2021-12-16 DIAGNOSIS — F41.9 ANXIETY: ICD-10-CM

## 2021-12-16 RX ORDER — VENLAFAXINE HYDROCHLORIDE 37.5 MG/1
37.5 CAPSULE, EXTENDED RELEASE ORAL DAILY
Qty: 90 CAPSULE | Refills: 1 | Status: SHIPPED | OUTPATIENT
Start: 2021-12-16 | End: 2022-03-16

## 2021-12-16 NOTE — TELEPHONE ENCOUNTER
From: Elroy North  To: Scottie Tamayo  Sent: 12/16/2021 8:55 AM EST  Subject: Effexor refill     Good morning! Im out of meds. Any way you could fill my script. Thanks!